# Patient Record
Sex: FEMALE | Race: BLACK OR AFRICAN AMERICAN | NOT HISPANIC OR LATINO | Employment: UNEMPLOYED | URBAN - METROPOLITAN AREA
[De-identification: names, ages, dates, MRNs, and addresses within clinical notes are randomized per-mention and may not be internally consistent; named-entity substitution may affect disease eponyms.]

---

## 2018-07-09 PROBLEM — L03.90 CELLULITIS: Status: ACTIVE | Noted: 2018-07-09

## 2018-07-09 PROBLEM — T76.01XA SUSPECTED ELDERLY VICTIM OF NEGLECT: Status: ACTIVE | Noted: 2018-07-09

## 2018-07-09 PROBLEM — N17.9 AKI (ACUTE KIDNEY INJURY): Status: ACTIVE | Noted: 2018-07-09

## 2018-07-10 PROBLEM — A41.9 SEVERE SEPSIS: Status: ACTIVE | Noted: 2018-07-10

## 2018-07-10 PROBLEM — N17.9 AKI (ACUTE KIDNEY INJURY): Status: ACTIVE | Noted: 2018-07-10

## 2018-07-10 PROBLEM — T76.01XA SUSPECTED ELDERLY VICTIM OF NEGLECT: Status: ACTIVE | Noted: 2018-07-10

## 2018-07-10 PROBLEM — R65.20 SEVERE SEPSIS: Status: ACTIVE | Noted: 2018-07-10

## 2018-07-11 PROBLEM — B86 SCABIES: Status: ACTIVE | Noted: 2018-07-09

## 2018-07-16 PROBLEM — E63.9 INADEQUATE DIETARY ENERGY INTAKE: Status: ACTIVE | Noted: 2018-07-16

## 2018-07-21 PROBLEM — R65.20 SEVERE SEPSIS: Status: RESOLVED | Noted: 2018-07-10 | Resolved: 2018-07-21

## 2018-07-21 PROBLEM — A41.9 SEVERE SEPSIS: Status: RESOLVED | Noted: 2018-07-10 | Resolved: 2018-07-21

## 2018-07-31 PROBLEM — N17.9 AKI (ACUTE KIDNEY INJURY): Status: RESOLVED | Noted: 2018-07-10 | Resolved: 2018-07-31

## 2018-08-06 PROBLEM — K59.09 OTHER CONSTIPATION: Status: ACTIVE | Noted: 2018-08-06

## 2018-08-24 PROBLEM — Q80.9 XERODERMIA: Status: ACTIVE | Noted: 2018-07-09

## 2018-09-18 ENCOUNTER — PATIENT OUTREACH (OUTPATIENT)
Dept: ADMINISTRATIVE | Facility: CLINIC | Age: 83
End: 2018-09-18

## 2018-09-18 NOTE — PATIENT INSTRUCTIONS
Discharge Instructions for Cellulitis  You have been diagnosed with cellulitis. This is an infection in the deepest layer of the skin. In some cases, the infection also affects the muscle. Cellulitis is caused by bacteria. The bacteria can enter the body through broken skin. This can happen with a cut, scratch, animal bite, or an insect bite that has been scratched. You may have been treated in the hospital with antibiotics and fluids. You will likely be given a prescription for antibiotics to take at home. This sheet will help you take care of yourself at home.  Home care  When you are home:  · Take the prescribed antibiotic medicine you are given as directed until it is gone. Take it even if you feel better. It treats the infection and stops it from returning. Not taking all the medicine can make future infections hard to treat.  · Keep the infected area clean.  · When possible, raise the infected area above the level of your heart. This helps keep swelling down.  · Talk with your healthcare provider if you are in pain. Ask what kind of over-the-counter medicine you can take for pain.  · Apply clean bandages as advised.  · Take your temperature once a day for a week.  · Wash your hands often to prevent spreading the infection.  In the future, wash your hands before and after you touch cuts, scratches, or bandages. This will help prevent infection.   When to call your healthcare provider  Call your healthcare provider immediately if you have any of the following:  · Difficulty or pain when moving the joints above or below the infected area  · Discharge or pus draining from the area  · Fever of 100.4°F (38°C) or higher, or as directed by your healthcare provider  · Pain that gets worse in or around the infected   · Redness that gets worse in or around the infected area, particularly if the area of redness expands to a wider area  · Shaking chills  · Swelling of the infected area  · Vomiting   Date Last Reviewed:  8/1/2016  © 7004-6430 [employer]. [address] [place] [address], [place] [address]. All rights reserved. This information is not intended as a substitute for professional medical care. Always follow your healthcare professional's instructions.

## 2018-09-18 NOTE — PROGRESS NOTES
[phone] Changed  [phone] RCB @ 1300 hrs  C3 nurse attempted to contact patient. The following occurred:   C3 nurse attempted to contact Gumaro Garner for a TCC post hospital discharge follow up call. The patient is unable to conduct the call @ this time. The patient requested a callback.    The patient [facility], C3 nurse with continued efforts to contact patient.

## 2018-09-28 ENCOUNTER — TELEPHONE (OUTPATIENT)
Dept: ADMINISTRATIVE | Facility: CLINIC | Age: 83
End: 2018-09-28

## 2018-11-08 PROBLEM — L30.9 DERMATITIS: Status: ACTIVE | Noted: 2018-11-08

## 2018-11-08 PROBLEM — L28.1 PRURIGO NODULARIS: Status: ACTIVE | Noted: 2018-11-08

## 2018-11-08 PROBLEM — G62.9 PERIPHERAL POLYNEUROPATHY: Status: ACTIVE | Noted: 2018-11-08

## 2018-11-08 PROBLEM — T76.01XA SUSPECTED ELDERLY VICTIM OF NEGLECT: Status: RESOLVED | Noted: 2018-07-10 | Resolved: 2018-11-08

## 2018-11-08 PROBLEM — K59.09 OTHER CONSTIPATION: Status: RESOLVED | Noted: 2018-08-06 | Resolved: 2018-11-08

## 2018-11-09 PROBLEM — E55.9 VITAMIN D DEFICIENCY: Status: ACTIVE | Noted: 2018-11-09

## 2018-11-19 PROBLEM — F51.01 PRIMARY INSOMNIA: Status: ACTIVE | Noted: 2018-11-19

## 2018-12-10 ENCOUNTER — TELEPHONE (OUTPATIENT)
Dept: ADMINISTRATIVE | Facility: CLINIC | Age: 83
End: 2018-12-10

## 2019-02-26 ENCOUNTER — TELEPHONE (OUTPATIENT)
Dept: ADMINISTRATIVE | Facility: CLINIC | Age: 84
End: 2019-02-26

## 2019-02-26 NOTE — TELEPHONE ENCOUNTER
Home Health SOC 02/06/2019 - 04/06/2019 with [facility] - Dr. Melissa Cunningham. Patient received SN, PT and OT services.

## 2019-04-02 PROBLEM — R53.81 DECLINING FUNCTIONAL STATUS: Status: ACTIVE | Noted: 2019-04-02

## 2019-04-02 PROBLEM — R46.89 COGNITIVE AND BEHAVIORAL CHANGES: Status: ACTIVE | Noted: 2019-04-02

## 2019-04-02 PROBLEM — R41.89 COGNITIVE AND BEHAVIORAL CHANGES: Status: ACTIVE | Noted: 2019-04-02
